# Patient Record
Sex: MALE | Race: WHITE | NOT HISPANIC OR LATINO | URBAN - NONMETROPOLITAN AREA
[De-identification: names, ages, dates, MRNs, and addresses within clinical notes are randomized per-mention and may not be internally consistent; named-entity substitution may affect disease eponyms.]

---

## 2019-11-06 ENCOUNTER — IMPORTED ENCOUNTER (OUTPATIENT)
Dept: URBAN - NONMETROPOLITAN AREA CLINIC 1 | Facility: CLINIC | Age: 64
End: 2019-11-06

## 2019-11-06 PROBLEM — H50.112: Noted: 2019-11-06

## 2019-11-06 PROBLEM — D31.32: Noted: 2019-11-06

## 2019-11-06 PROBLEM — H25.13: Noted: 2019-11-06

## 2019-11-06 PROBLEM — E11.3391: Noted: 2019-11-06

## 2019-11-06 PROBLEM — H43.811: Noted: 2019-11-06

## 2019-11-06 PROBLEM — H52.4: Noted: 2019-11-06

## 2019-11-06 PROBLEM — H34.8110: Noted: 2019-11-06

## 2019-11-06 PROCEDURE — 92014 COMPRE OPH EXAM EST PT 1/>: CPT

## 2019-11-06 PROCEDURE — 92015 DETERMINE REFRACTIVE STATE: CPT

## 2019-11-06 NOTE — PATIENT DISCUSSION
"NIDDM since 2006-  Discussed findings in detail w/ pt today-  Stressed importance of good blood sugar control/diet-  Recommend no sodas -  Dot blots noted today OD and OS stable -  Letter to James -  Continue to montior CRVO OD possible DME and Papilledema (Onset unnown)-  Discussed diagnosis in detail with patient-  Onset decreased VA for sometime now-  Patient complains of headaches -  IOP's are stable today -  Recommend patient seeing Dr. Slime Bonilla. Patient agrees with plan-  Continue to montior  Catarats OU-  Discussed findings in detail w/ pt today-  Discussed signs and symptoms of progression -  Discussed UV protection-  Recommend cataract eval with Dr. Cristina Yun. Patient agrees with plan-  Would like to wait on evbal with Dr. 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 -  Patient states he sees a ""line"" in his vision which I believe is a floater. -  No holes tears or detachments noted at this time. -  The risk of retinal detachment in patients with PVDs was discussed with the patient and the warning signs of retinal detachment were carefully reviewed with the patient. -  The patient was warned to return to the office or contact the ophthalmologist on call immediately if they experience signs of retinal detachment or changes in vision noted from today. -  Continue to monitor.  Hyperopia/astig/presby-  Discussed refractive status in detail w/ pt today-  Mild Exotropia OS but stable from previous charted notes-  Monitor yearly or PRN; 's Notes: PCP:  [address]  11/8/16DFE  11/8/16[facility] 4/30/14"

## 2019-11-14 ENCOUNTER — IMPORTED ENCOUNTER (OUTPATIENT)
Dept: URBAN - NONMETROPOLITAN AREA CLINIC 1 | Facility: CLINIC | Age: 64
End: 2019-11-14

## 2019-11-14 PROCEDURE — 92083 EXTENDED VISUAL FIELD XM: CPT

## 2019-11-14 NOTE — PATIENT DISCUSSION
"NIDDM since 2006-  Discussed findings in detail w/ pt today-  Stressed importance of good blood sugar control/diet-  Recommend no sodas -  Dot blots noted today OD and OS stable -  Letter to James -  Continue to montior CRVO OD possible DME and Papilledema (Onset unnown)-  Discussed diagnosis in detail with patient-  Onset decreased VA for sometime now-  Patient complains of headaches -  IOP's are stable today -  Recommend patient seeing Dr. Brody Jiang. Patient agrees with plan-  VF done 11/14/19 OD:  Shows Fair reliability and borderline inferior defect. -  Continue to montior  Catarats OU-  Discussed findings in detail w/ pt today-  Discussed signs and symptoms of progression -  Discussed UV protection-  Recommend cataract eval with Dr. Laurel Wilder. Patient agrees with plan-  Would like to wait on evbal with Dr. Laurel Wilder until patient sees the Retina specialist fpr CRVO OD -  Continue to monitor Chor Nevus OS-  Discussed findings w/ pt today-  Stable no changes in shape/size-  Monitor PRN Mild Dermatochalasis OU-  Discussed findings w/ pt today-  Stable no superior field of vision complaint noted-  Monitor PRN PVD OD:  -  Discussed findings of exam in detail with the patient. -  Patient states he sees a ""line"" in his vision which I believe is a floater. -  No holes tears or detachments noted at this time. -  The risk of retinal detachment in patients with PVDs was discussed with the patient and the warning signs of retinal detachment were carefully reviewed with the patient. -  The patient was warned to return to the office or contact the ophthalmologist on call immediately if they experience signs of retinal detachment or changes in vision noted from today. -  Continue to monitor.  Hyperopia/astig/presby-  Discussed refractive status in detail w/ pt today-  Mild Exotropia OS but stable from previous charted notes-  Monitor yearly or PRN; Dr's Notes: PCP:  [address]  11/8/16DFE  11/8/16[facility] 4/30/14"

## 2022-04-10 ASSESSMENT — VISUAL ACUITY
OD_CC: 20/20
OS_CC: 20/20
OD_SC: 20/100-1
OS_PH: 20/29+1
OS_SC: 20/40-1

## 2022-04-10 ASSESSMENT — TONOMETRY
OS_IOP_MMHG: 10
OD_IOP_MMHG: 10

## 2022-08-12 ENCOUNTER — COMPREHENSIVE EXAM (OUTPATIENT)
Dept: URBAN - NONMETROPOLITAN AREA CLINIC 1 | Facility: CLINIC | Age: 67
End: 2022-08-12

## 2022-08-12 DIAGNOSIS — H50.112: ICD-10-CM

## 2022-08-12 DIAGNOSIS — H25.13: ICD-10-CM

## 2022-08-12 PROCEDURE — 92015 DETERMINE REFRACTIVE STATE: CPT

## 2022-08-12 PROCEDURE — 99214 OFFICE O/P EST MOD 30 MIN: CPT

## 2022-08-12 ASSESSMENT — VISUAL ACUITY
OS_SC: 20/50
OD_SC: 20/100

## 2022-08-12 ASSESSMENT — TONOMETRY
OS_IOP_MMHG: 11
OD_IOP_MMHG: 12

## 2022-08-12 NOTE — PATIENT DISCUSSION
Retinal exam findings communicated to Physician managing diabetes. Notes to be sent to [facility].

## 2022-08-12 NOTE — PATIENT DISCUSSION
(Surgical) Visually Significant (secondary to glare), discussed the risks, benefits, alternatives, and limitations of cataract surgery. The patient stated a full understanding and a desire to proceed with the procedure.

## 2022-12-14 ENCOUNTER — CONSULTATION/EVALUATION (OUTPATIENT)
Dept: URBAN - NONMETROPOLITAN AREA CLINIC 1 | Facility: CLINIC | Age: 67
End: 2022-12-14

## 2022-12-14 PROCEDURE — 99214 OFFICE O/P EST MOD 30 MIN: CPT

## 2022-12-14 ASSESSMENT — VISUAL ACUITY
OD_PH: 20/80-1
OD_CC: 20/25
OD_PAM: 20/20
OS_AM: 20/20-2
OS_BAT: 20/200
OD_SC: 20/200
OS_CC: 20/30
OS_SC: 20/40

## 2022-12-14 ASSESSMENT — TONOMETRY
OD_IOP_MMHG: 13
OS_IOP_MMHG: 12

## 2022-12-14 NOTE — PATIENT DISCUSSION
Standard/Traditional: The need for bifocals with the Standard/Traditional surgery was discussed with the patient. The patient elects to proceed with CE Standard/Traditional OU. Very guarded prognosis discussed OD with VA expectations and discussed reason to proceed with surgery to see retina more clear and treat.

## 2022-12-14 NOTE — PATIENT DISCUSSION
Visually significant cataract. Cataract(s) causing symptomatic impairment of visual function not correctable with a tolerable change in glasses or contact lenses, lighting, or non-operative means resulting in specific activity limitations and/or participation restrictions including, but not limited to reading, viewing television, driving, or meeting vocational or recreational needs. Expectation is clearer vision and functional improvement in symptoms as well as reduced glare disability after cataract removal. Order IOL Master and OPD today. Recommend Toric IOL / Limbal Relaxing Incisions / Multifocal, based on today's OPD testing and lifestyle questionnaire. All questions were answered regarding surgery, including pre- and post-op medications, appointments, activity restrictions and anesthetic usage. The risks, benefits and alternatives, and special risk factors for the patient, were discussed in detail, including but not limited to bleeding, infection, retinal detachment, vitreous loss, problems with the implant, and possible need for additional surgery. Although rare, the possibility of complete vision loss was discussed. The possible need for glasses post-operatively was discussed. Order medical clearance exam based on history of hypertenson Patient elects to proceed with cataract surgery OS. Will schedule at patient's convenience and re-evaluate OD  in the future.

## 2022-12-14 NOTE — PATIENT DISCUSSION
Discussed diagnosis in detail with patient. Patient was seeing Dr. Kadie Mcdaniel but stopped going.

## 2022-12-14 NOTE — PATIENT DISCUSSION
Retinal exam findings communicated to Physician managing diabetes. Notes to be sent to [facility].

## 2023-01-11 ENCOUNTER — PRE-OP/H&P (OUTPATIENT)
Dept: URBAN - NONMETROPOLITAN AREA CLINIC 1 | Facility: CLINIC | Age: 68
End: 2023-01-11

## 2023-01-11 VITALS
HEART RATE: 58 BPM | BODY MASS INDEX: 23.52 KG/M2 | WEIGHT: 168 LBS | SYSTOLIC BLOOD PRESSURE: 98 MMHG | HEIGHT: 71 IN | DIASTOLIC BLOOD PRESSURE: 72 MMHG

## 2023-01-11 DIAGNOSIS — Z01.818: ICD-10-CM

## 2023-01-11 PROCEDURE — 99213 OFFICE O/P EST LOW 20 MIN: CPT

## 2023-01-11 ASSESSMENT — VISUAL ACUITY
OD_CC: 20/25
OS_BAT: 20/200
OS_AM: 20/20-2
OD_PAM: 20/20
OD_PH: 20/80-1
OS_CC: 20/30
OS_SC: 20/40
OD_SC: 20/200

## 2023-01-11 NOTE — PATIENT DISCUSSION
Retinal exam findings communicated to Physician managing diabetes. Notes to be sent to [facility].